# Patient Record
Sex: FEMALE | Race: WHITE | NOT HISPANIC OR LATINO | Employment: PART TIME | URBAN - METROPOLITAN AREA
[De-identification: names, ages, dates, MRNs, and addresses within clinical notes are randomized per-mention and may not be internally consistent; named-entity substitution may affect disease eponyms.]

---

## 2018-12-13 ENCOUNTER — APPOINTMENT (EMERGENCY)
Dept: RADIOLOGY | Facility: HOSPITAL | Age: 28
End: 2018-12-13
Payer: MEDICARE

## 2018-12-13 ENCOUNTER — HOSPITAL ENCOUNTER (EMERGENCY)
Facility: HOSPITAL | Age: 28
Discharge: HOME | End: 2018-12-13
Attending: EMERGENCY MEDICINE | Admitting: EMERGENCY MEDICINE
Payer: MEDICARE

## 2018-12-13 VITALS
OXYGEN SATURATION: 98 % | TEMPERATURE: 98.5 F | RESPIRATION RATE: 16 BRPM | DIASTOLIC BLOOD PRESSURE: 64 MMHG | SYSTOLIC BLOOD PRESSURE: 125 MMHG | WEIGHT: 122 LBS | HEART RATE: 79 BPM

## 2018-12-13 DIAGNOSIS — J45.21 MILD INTERMITTENT ASTHMA WITH EXACERBATION: Primary | ICD-10-CM

## 2018-12-13 PROCEDURE — 71046 X-RAY EXAM CHEST 2 VIEWS: CPT

## 2018-12-13 PROCEDURE — 99283 EMERGENCY DEPT VISIT LOW MDM: CPT

## 2018-12-13 RX ORDER — PREDNISONE 10 MG/1
40 TABLET ORAL DAILY
Qty: 20 TABLET | Refills: 0 | Status: SHIPPED | OUTPATIENT
Start: 2018-12-13 | End: 2018-12-18

## 2018-12-13 RX ORDER — FLUTICASONE PROPIONATE 50 MCG
1 SPRAY, SUSPENSION (ML) NASAL DAILY
Qty: 16 G | Refills: 0 | Status: SHIPPED | OUTPATIENT
Start: 2018-12-13 | End: 2019-12-13

## 2018-12-13 RX ORDER — ALBUTEROL SULFATE 90 UG/1
2 AEROSOL, METERED RESPIRATORY (INHALATION) ONCE
Status: COMPLETED | OUTPATIENT
Start: 2018-12-13 | End: 2018-12-13

## 2018-12-13 RX ADMIN — ALBUTEROL SULFATE 2 PUFF: 90 AEROSOL, METERED RESPIRATORY (INHALATION) at 14:05

## 2018-12-13 NOTE — ED PROVIDER NOTES
History  Chief Complaint   Patient presents with    Ear Problem     Pt states " I hear my heart beat in my left ear and ringing in my right " Notes had a concussion one year ago  Also notes weight loss over the last month  Reports cough and wheezing  Has hx of asthma   Cough       History provided by:  Patient  Cough   Cough characteristics:  Dry  Sputum characteristics:  Nondescript  Severity:  Moderate  Onset quality:  Gradual  Duration:  2 weeks  Timing:  Intermittent  Progression:  Waxing and waning  Chronicity:  New  Smoker: yes    Context: upper respiratory infection    Context comment:  History of asthma and smokes  Relieved by:  None tried  Worsened by:  Nothing  Ineffective treatments:  None tried  Associated symptoms: ear fullness    Associated symptoms: no chest pain, no chills, no diaphoresis, no ear pain, no eye discharge, no fever, no headaches, no rash, no rhinorrhea, no shortness of breath, no sore throat and no wheezing        None       Past Medical History:   Diagnosis Date    Asthma        History reviewed  No pertinent surgical history  History reviewed  No pertinent family history  I have reviewed and agree with the history as documented  Social History   Substance Use Topics    Smoking status: Current Every Day Smoker    Smokeless tobacco: Never Used    Alcohol use Yes        Review of Systems   Constitutional: Negative for activity change, chills, diaphoresis and fever  HENT: Negative for congestion, ear pain, rhinorrhea, sinus pressure and sore throat  Eyes: Negative for pain, discharge and visual disturbance  Respiratory: Positive for cough  Negative for chest tightness, shortness of breath, wheezing and stridor  Cardiovascular: Negative for chest pain and palpitations  Gastrointestinal: Negative for abdominal distention, abdominal pain, constipation, diarrhea, nausea and vomiting  Genitourinary: Negative for dysuria and frequency     Musculoskeletal: Negative for neck pain and neck stiffness  Skin: Negative for rash  Neurological: Negative for dizziness, speech difficulty, light-headedness, numbness and headaches  Physical Exam  Physical Exam   Constitutional: She is oriented to person, place, and time  She appears well-developed  No distress  HENT:   Head: Normocephalic and atraumatic  Eyes: Pupils are equal, round, and reactive to light  Neck: Normal range of motion  Neck supple  No tracheal deviation present  Cardiovascular: Normal rate, regular rhythm, normal heart sounds and intact distal pulses  No murmur heard  Pulmonary/Chest: Effort normal  No stridor  No respiratory distress  She has wheezes  Abdominal: Soft  She exhibits no distension  There is no tenderness  There is no rebound and no guarding  Musculoskeletal: Normal range of motion  Neurological: She is alert and oriented to person, place, and time  Skin: Skin is warm and dry  She is not diaphoretic  No erythema  No pallor  Psychiatric: She has a normal mood and affect  Vitals reviewed  Vital Signs  ED Triage Vitals [12/13/18 1337]   Temperature Pulse Respirations Blood Pressure SpO2   98 5 °F (36 9 °C) 79 16 125/64 98 %      Temp Source Heart Rate Source Patient Position - Orthostatic VS BP Location FiO2 (%)   Oral -- -- -- --      Pain Score       7           Vitals:    12/13/18 1337   BP: 125/64   Pulse: 79       Visual Acuity      ED Medications  Medications   albuterol (PROVENTIL HFA,VENTOLIN HFA) inhaler 2 puff (2 puffs Inhalation Given 12/13/18 1405)       Diagnostic Studies  Results Reviewed     None                 XR chest 2 views   ED Interpretation by Gildardo Johnson DO (12/13 1405)   No acute pathology      Final Result by Yusuf Doss MD (12/13 9148)      No acute cardiopulmonary disease              Workstation performed: [id number]                    Procedures  Procedures       Phone Contacts  ED Phone Contact    ED Course MDM  Number of Diagnoses or Management Options  Mild intermittent asthma with exacerbation: new and requires workup  Diagnosis management comments: 57-year-old female history of asthma, on off URI symptoms over the past couple of weeks, chest x-ray without any evidence of pneumonia  Will place on a steroid pulse, given a albuterol inhaler here , will discharge       Amount and/or Complexity of Data Reviewed  Tests in the radiology section of CPT®: ordered and reviewed  Review and summarize past medical records: yes  Independent visualization of images, tracings, or specimens: yes      CritCare Time    Disposition  Final diagnoses:   Mild intermittent asthma with exacerbation     Time reflects when diagnosis was documented in both MDM as applicable and the Disposition within this note     Time User Action Codes Description Comment    12/13/2018  2:06 PM Denise Media Add [J45 21] Mild intermittent asthma with exacerbation       ED Disposition     ED Disposition Condition Comment    Discharge  [address] discharge to home/self care  Condition at discharge: Good        Follow-up Information    None         Discharge Medication List as of 12/13/2018  2:06 PM      START taking these medications    Details   fluticasone (FLONASE) 50 mcg/act nasal spray 1 spray into each nostril daily, Starting Thu 12/13/2018, Until Fri 12/13/2019, Print      predniSONE 10 mg tablet Take 4 tablets (40 mg total) by mouth daily for 5 days, Starting Thu 12/13/2018, Until Tue 12/18/2018, Print           No discharge procedures on file      ED Provider  Electronically Signed by           Bebo Mccann DO  12/13/18 8373

## 2018-12-13 NOTE — DISCHARGE INSTRUCTIONS
Asthma, Ambulatory Care   GENERAL INFORMATION:   Asthma  is a lung disease that makes breathing difficult  Chronic inflammation and reactions to triggers narrow the airways in your lungs  Asthma can become life-threatening if it is not managed  Common symptoms include the following:   · Coughing     · Wheezing     · Shortness of breath     · Chest tightness  Seek immediate care for the following symptoms:   · Severe shortness of breath    · Blue or gray lips or nails    · Skin around your neck and ribs pulls in with each breath    · Shortness of breath, even after you take your short-term medicine as directed     · Peak flow numbers in the red zone of your asthma action plan  Treatment for asthma  will depend on how severe it is  Medicine may decrease inflammation, open airways, and make it easier to breathe  Medicines may be inhaled, taken as a pill, or injected  Short-term medicines relieve your symptoms quickly  Long-term medicines are used to prevent future attacks  You may also need medicine to help control your allergies  Manage and prevent future asthma attacks:   · Follow your asthma action pan  This is a written plan that you and your healthcare provider create  It explains which medicine you need and when to change doses if necessary  It also explains how you can monitor symptoms and use a peak flow meter  The meter measures how well your lungs are working  · Manage other health conditions , such as allergies, acid reflux, and sleep apnea  · Identify and avoid triggers  These may include pets, dust mites, mold, and cockroaches  · Do not smoke and avoid others who smoke  If you smoke, it is never too late to quit  Ask your healthcare provider if you need help quitting  · Ask about a flu vaccine  The flu can make your asthma worse  You may need a yearly flu shot  Follow up with your healthcare provider as directed:   You will need to return to make sure your medicine is working and your symptoms are controlled  You may be referred to an asthma or allergy specialist  Fredany Zazueta may be asked to keep a record of your peak flow values and bring it with you to your appointments  Write down your questions so you remember to ask them during your visits  CARE AGREEMENT:   You have the right to help plan your care  Learn about your health condition and how it may be treated  Discuss treatment options with your caregivers to decide what care you want to receive  You always have the right to refuse treatment  The above information is an  only  It is not intended as medical advice for individual conditions or treatments  Talk to your doctor, nurse or pharmacist before following any medical regimen to see if it is safe and effective for you  © 2014 [address] is for End User's use only and may not be sold, redistributed or otherwise used for commercial purposes  All illustrations and images included in CareNotes® are the copyrighted property of A D A M [employer] Inc  or Akshat Patel

## 2019-07-15 ENCOUNTER — HOSPITAL ENCOUNTER (EMERGENCY)
Facility: HOSPITAL | Age: 29
Discharge: HOME | End: 2019-07-15
Attending: EMERGENCY MEDICINE | Admitting: EMERGENCY MEDICINE
Payer: MEDICARE

## 2019-07-15 VITALS
WEIGHT: 115 LBS | TEMPERATURE: 98.6 F | HEART RATE: 71 BPM | RESPIRATION RATE: 18 BRPM | HEIGHT: 63 IN | DIASTOLIC BLOOD PRESSURE: 52 MMHG | BODY MASS INDEX: 20.38 KG/M2 | OXYGEN SATURATION: 100 % | SYSTOLIC BLOOD PRESSURE: 108 MMHG

## 2019-07-15 DIAGNOSIS — M54.2 NECK PAIN: Primary | ICD-10-CM

## 2019-07-15 PROCEDURE — 99284 EMERGENCY DEPT VISIT MOD MDM: CPT | Performed by: EMERGENCY MEDICINE

## 2019-07-15 PROCEDURE — 99283 EMERGENCY DEPT VISIT LOW MDM: CPT

## 2019-07-15 PROCEDURE — 96372 THER/PROPH/DIAG INJ SC/IM: CPT

## 2019-07-15 RX ORDER — KETOROLAC TROMETHAMINE 30 MG/ML
15 INJECTION, SOLUTION INTRAMUSCULAR; INTRAVENOUS ONCE
Status: COMPLETED | OUTPATIENT
Start: 2019-07-15 | End: 2019-07-15

## 2019-07-15 RX ORDER — LIDOCAINE 50 MG/G
1 PATCH TOPICAL ONCE
Status: DISCONTINUED | OUTPATIENT
Start: 2019-07-15 | End: 2019-07-15 | Stop reason: HOSPADM

## 2019-07-15 RX ORDER — ALBUTEROL SULFATE 2.5 MG/3ML
2.5 SOLUTION RESPIRATORY (INHALATION) EVERY 6 HOURS PRN
COMMUNITY
Start: 2017-10-31

## 2019-07-15 RX ORDER — MELOXICAM 7.5 MG/1
7.5 TABLET ORAL DAILY
COMMUNITY
Start: 2019-06-26 | End: 2019-07-26

## 2019-07-15 RX ORDER — METHOCARBAMOL 750 MG/1
750 TABLET, FILM COATED ORAL 4 TIMES DAILY PRN
COMMUNITY
Start: 2019-06-26

## 2019-07-15 RX ADMIN — KETOROLAC TROMETHAMINE 15 MG: 30 INJECTION, SOLUTION INTRAMUSCULAR at 15:22

## 2019-07-15 RX ADMIN — LIDOCAINE 1 PATCH: 50 PATCH CUTANEOUS at 15:22

## 2019-07-15 NOTE — ED PROVIDER NOTES
History  Chief Complaint   Patient presents with    Neck Pain     Pt reports feeling a "snap from her head to her neck after picking up a bag" Pt reports she was seen at [facility] for this, diagnoses with a sprain and sent home  Pt reports the pain is worse now      30yo presenting with neck pain which started 2 weeks ago  She states she was lifting a heavy bag and felt and heard a snap sound  Describes the pain as a constant 9/10 pain which starts at the base of the left side of her head and radiates to her shoulder  Patient states she was seen at 2020  for the pain a week ago and has follow-up appointments scheduled with her PCP and orthopedics soon, but the pain is constant and has not improved  Also complains of some tingling in both hands bilaterally  She has tried meloxicam which does not help her pain and heat which slightly reduces her pain  Denies any fever, chills, SOB, swelling in extremities  Prior to Admission Medications   Prescriptions Last Dose Informant Patient Reported? Taking? albuterol (2 5 mg/3 mL) 0 083 % nebulizer solution Past Week at Unknown time  Yes Yes   Sig: Inhale 2 5 mg every 6 (six) hours as needed   fluticasone (FLONASE) 50 mcg/act nasal spray Past Week at Unknown time  No Yes   Si spray into each nostril daily   meloxicam (MOBIC) 7 5 mg tablet Past Week at Unknown time  Yes Yes   Sig: Take 7 5 mg by mouth daily   methocarbamol (ROBAXIN-750) 750 mg tablet Past Week at Unknown time  Yes Yes   Sig: Take 750 mg by mouth 4 (four) times a day as needed      Facility-Administered Medications: None       Past Medical History:   Diagnosis Date    Asthma     Chronic pain     Lordosis     Tumor     "T1 Tumor"       History reviewed  No pertinent surgical history  History reviewed  No pertinent family history  I have reviewed and agree with the history as documented      Social History     Tobacco Use    Smoking status: Current Every Day Smoker     Packs/day: 0 25    Smokeless tobacco: Never Used   Substance Use Topics    Alcohol use: Yes     Comment: Occ    Drug use: Yes     Types: Marijuana     Comment: Medical         Review of Systems   Constitutional: Negative for fever  HENT: Negative for ear pain and tinnitus  Respiratory: Negative for chest tightness, shortness of breath, wheezing and stridor  Cardiovascular: Negative for chest pain, palpitations and leg swelling  Gastrointestinal: Negative for nausea and vomiting  Musculoskeletal: Positive for neck pain and neck stiffness  Negative for back pain  Skin: Negative for color change, rash and wound  Neurological: Negative for syncope  All other systems reviewed and are negative  Physical Exam  ED Triage Vitals [07/15/19 1410]   Temperature Pulse Respirations Blood Pressure SpO2   98 6 °F (37 °C) 71 18 108/52 100 %      Temp Source Heart Rate Source Patient Position - Orthostatic VS BP Location FiO2 (%)   Oral Monitor Sitting Left arm --      Pain Score       9             Orthostatic Vital Signs  Vitals:    07/15/19 1410   BP: 108/52   Pulse: 71   Patient Position - Orthostatic VS: Sitting       Physical Exam   Constitutional: She is oriented to person, place, and time  She appears well-developed and well-nourished  No distress  HENT:   Head: Normocephalic and atraumatic  Eyes: Conjunctivae and EOM are normal  Right eye exhibits no discharge  Left eye exhibits no discharge  No scleral icterus  Neck: No JVD present  No tracheal deviation present  Left sided neck and trapezius tenderness   Cardiovascular: Normal rate, regular rhythm, normal heart sounds and intact distal pulses  Exam reveals no gallop and no friction rub  No murmur heard  Pulmonary/Chest: Effort normal and breath sounds normal  No stridor  No respiratory distress  She has no wheezes  She has no rales  She exhibits no tenderness  Musculoskeletal: She exhibits tenderness  She exhibits no edema or deformity     Pain with all planes of movement of neck   Neurological: She is alert and oriented to person, place, and time  No cranial nerve deficit or sensory deficit  She exhibits normal muscle tone  Skin: Skin is warm  Capillary refill takes less than 2 seconds  No rash noted  She is not diaphoretic  No erythema  Psychiatric: She has a normal mood and affect  Her behavior is normal  Judgment and thought content normal        ED Medications  Medications   lidocaine (LIDODERM) 5 % patch 1 patch (1 patch Topical Medication Applied 7/15/19 1522)   ketorolac (TORADOL) injection 15 mg (15 mg Intramuscular Given 7/15/19 1522)       Diagnostic Studies  Results Reviewed     None                 No orders to display         Procedures  Procedures        ED Course                               MDM    Disposition  Final diagnoses:   Neck pain     Time reflects when diagnosis was documented in both MDM as applicable and the Disposition within this note     Time User Action Codes Description Comment    7/15/2019  3:23 PM Daniele Pollack [M54 2] Neck pain       ED Disposition     ED Disposition Condition Date/Time Comment    Discharge Stable Mon Jul 15, 2019  3:36 PM Lory Frederick discharge to home/self care  Follow-up Information     Follow up With Specialties Details Why Contact Info Additional [address] Emergency Department Emergency Medicine  If symptoms worsen [address]  [phone]  ED, [address] [place] [address], [facility]          Patient's Medications   Discharge Prescriptions    No medications on file     No discharge procedures on file  ED Provider  Attending physically available and evaluated Lory Frederick I managed the patient along with the ED Attending      Electronically Signed by         Jacques Leone DO  07/15/19 8090

## 2019-07-15 NOTE — ED ATTENDING ATTESTATION
Felizardo Barthel, saw and evaluated the patient  I have discussed the patient with the resident/non-physician practitioner and agree with the resident's/non-physician practitioner's findings, Plan of Care, and MDM as documented in the resident's/non-physician practitioner's note, except where noted  All available labs and Radiology studies were reviewed  I was present for key portions of any procedure(s) performed by the resident/non-physician practitioner and I was immediately available to provide assistance  At this point I agree with the current assessment done in the Emergency Department  I have conducted an independent evaluation of this patient a history and physical is as follows:      Patient is a 82-year-old female tells me 2 weeks ago she picked up something and felt a snap in the left side of her neck base  Since then she has been having some mild pain in that area, no numbness, no tingling, no weakness  No direct trauma, no fevers or chills  She does say she has 11 years of chronic diffuse body pain everywhere  She says she was seen at [facility] emergency department about 2 weeks ago after the incident, diagnosed with a muscular strain and referred to follow up with her PCP and Orthopedics  She has not been able to do so yet, she says because of family issues however she does have an appointment with each, believes there sometime this week  She said she has not been taking any Tylenol, ibuprofen or other medications for the pain because they reportedly do not do anything for her  She does say she was prescribed a lidocaine patch from [facility] Emergency Department  She has been prescribed meloxicam and a Robaxin by other providers for her chronic discomfort, she is occasionally using them but not consistently  She stopped using the lidocaine patch because she said it was not very helpful      General:  Patient is well-appearing  Head:  Atraumatic  Eyes:  Conjunctiva pink, Extraocular muscle intact  ENT:  Mucous members are moist  Neck:  Supple, there is no warmth, no redness  She has no cervical, thoracic or lumbar spinal tenderness or step-offs or deformities  Cardiac:  S1-S2, without murmurs  Lungs:  Clear to auscultation bilaterally  Abdomen:  Soft, nontender, normal bowel sounds, no CVA tenderness, no tympany, no rigidity, no guarding  Extremities:  Normal range of motion  Neurologic:  Awake, fluent speech, normal comprehension  Her sensation is equal and symmetric and intact throughout the neck, trunk and arms and completely symmetric  Strength is 5/5 of the bilateral shoulders, elbows, wrists,  strengths are equal and symmetric bilaterally  There is no deficit in motor or sensory function in the median, radial, ulnar nerve distribution in either arm  Radial pulses are equal and symmetric  Negative Spurling, negative elevated arm stress test  Skin:  Pink warm and dry  Psychiatric:  Alert, pleasant, cooperative    The patient has no signs of neurologic compromise, no weakness, no sensory deficits  Do not believe she requires any neuroimaging at this point, is stable to follow up with PCP and Orthopedics for further outpatient care      Critical Care Time  Procedures

## 2020-03-30 ENCOUNTER — NURSE TRIAGE (OUTPATIENT)
Dept: OTHER | Facility: OTHER | Age: 30
End: 2020-03-30

## 2020-03-30 NOTE — TELEPHONE ENCOUNTER
Regarding: Coronavirus  ----- Message from Dilcia Nazario sent at 3/30/2020  4:58 AM EDT -----  "I found out a fellow employee was tested positive for Coronavirus   The only symptom I currently having is diarrhea "

## 2020-03-30 NOTE — TELEPHONE ENCOUNTER
Called and left message for patient to call her PCP in regards to what she should do  Also advised that if she needs testing to go back to work,  and follow their protocol  Can call back to hotline for further questions

## 2022-04-13 ENCOUNTER — TELEPHONE (OUTPATIENT)
Dept: PSYCHIATRY | Facility: CLINIC | Age: 32
End: 2022-04-13

## 2022-04-20 ENCOUNTER — TELEPHONE (OUTPATIENT)
Dept: PSYCHIATRY | Facility: CLINIC | Age: 32
End: 2022-04-20

## 2022-04-20 NOTE — TELEPHONE ENCOUNTER
Called pt to let pt know that the referral came in  And placed pt on the referral wait list for both talk and med mgmt   Call couldn't be completed at the time I called

## 2022-05-11 ENCOUNTER — HOSPITAL ENCOUNTER (EMERGENCY)
Facility: HOSPITAL | Age: 32
Discharge: HOME | End: 2022-05-11
Attending: EMERGENCY MEDICINE | Admitting: EMERGENCY MEDICINE
Payer: MEDICARE

## 2022-05-11 ENCOUNTER — APPOINTMENT (EMERGENCY)
Dept: RADIOLOGY | Facility: HOSPITAL | Age: 32
End: 2022-05-11
Payer: MEDICARE

## 2022-05-11 VITALS
SYSTOLIC BLOOD PRESSURE: 118 MMHG | DIASTOLIC BLOOD PRESSURE: 60 MMHG | BODY MASS INDEX: 20.73 KG/M2 | WEIGHT: 117 LBS | HEART RATE: 73 BPM | RESPIRATION RATE: 20 BRPM | TEMPERATURE: 97.9 F | OXYGEN SATURATION: 93 %

## 2022-05-11 DIAGNOSIS — J45.901 ASTHMA EXACERBATION: ICD-10-CM

## 2022-05-11 DIAGNOSIS — R06.00 DYSPNEA: Primary | ICD-10-CM

## 2022-05-11 DIAGNOSIS — J06.9 URI (UPPER RESPIRATORY INFECTION): ICD-10-CM

## 2022-05-11 LAB
ATRIAL RATE: 72 BPM
FLUAV RNA RESP QL NAA+PROBE: NEGATIVE
FLUBV RNA RESP QL NAA+PROBE: NEGATIVE
QRS AXIS: 83 DEGREES
QRSD INTERVAL: 94 MS
QT INTERVAL: 406 MS
QTC INTERVAL: 412 MS
RSV RNA RESP QL NAA+PROBE: NEGATIVE
SARS-COV-2 RNA RESP QL NAA+PROBE: NEGATIVE
T WAVE AXIS: 72 DEGREES
VENTRICULAR RATE: 62 BPM

## 2022-05-11 PROCEDURE — 99284 EMERGENCY DEPT VISIT MOD MDM: CPT | Performed by: EMERGENCY MEDICINE

## 2022-05-11 PROCEDURE — 71046 X-RAY EXAM CHEST 2 VIEWS: CPT

## 2022-05-11 PROCEDURE — 93005 ELECTROCARDIOGRAM TRACING: CPT

## 2022-05-11 PROCEDURE — 93010 ELECTROCARDIOGRAM REPORT: CPT | Performed by: INTERNAL MEDICINE

## 2022-05-11 PROCEDURE — 0241U HB NFCT DS VIR RESP RNA 4 TRGT: CPT | Performed by: EMERGENCY MEDICINE

## 2022-05-11 PROCEDURE — 94640 AIRWAY INHALATION TREATMENT: CPT

## 2022-05-11 PROCEDURE — 99285 EMERGENCY DEPT VISIT HI MDM: CPT

## 2022-05-11 RX ORDER — PREDNISONE 20 MG/1
60 TABLET ORAL ONCE
Status: COMPLETED | OUTPATIENT
Start: 2022-05-11 | End: 2022-05-11

## 2022-05-11 RX ORDER — PREDNISONE 20 MG/1
40 TABLET ORAL DAILY
Qty: 8 TABLET | Refills: 0 | Status: SHIPPED | OUTPATIENT
Start: 2022-05-11 | End: 2022-05-15

## 2022-05-11 RX ORDER — ALBUTEROL SULFATE 90 UG/1
2 AEROSOL, METERED RESPIRATORY (INHALATION) EVERY 6 HOURS PRN
Qty: 20.1 G | Refills: 0 | Status: SHIPPED | OUTPATIENT
Start: 2022-05-11

## 2022-05-11 RX ORDER — IPRATROPIUM BROMIDE AND ALBUTEROL SULFATE 2.5; .5 MG/3ML; MG/3ML
3 SOLUTION RESPIRATORY (INHALATION)
Status: DISCONTINUED | OUTPATIENT
Start: 2022-05-11 | End: 2022-05-11 | Stop reason: HOSPADM

## 2022-05-11 RX ADMIN — PREDNISONE 60 MG: 20 TABLET ORAL at 13:35

## 2022-05-11 RX ADMIN — IPRATROPIUM BROMIDE AND ALBUTEROL SULFATE 3 ML: 2.5; .5 SOLUTION RESPIRATORY (INHALATION) at 13:36

## 2022-05-11 NOTE — ED ATTENDING ATTESTATION
Manjula Cheng MD, saw and evaluated the patient  I have discussed the patient with the resident and agree with the resident's findings, Plan of Care, and MDM as documented in the resident's note, except where noted  All available labs and Radiology studies were reviewed  I was present for key portions of any procedure(s) performed by the resident and I was immediately available to provide assistance  At this point I agree with the current assessment done in the Emergency Department  I have conducted an independent evaluation of this patient a history and physical is as follows:    37 yo female with a history of asthma and generalized anxiety disorder presents to the ED complaining of URI symptoms x 3 days  The patient reports a cough productive of "clear" sputum, rhinorrhea, nasal congestion, a mild sore throat, and generalized fatigue  (+) Mild shortness of breath with exertion  (+) Tobacco user  (+) Sick contact --> son with similar symptoms at home  The patient did not receive the COVID or influenza vaccines  No fevers/chills  No chest pain or tightness  She denies LE swelling/pain  No hemoptysis  No other specific complaints  Gen: NAD, AA&Ox3  HEENT: PERRL, EOMI, TMs clear B/L  Throat: (+) mild erythema, no tonsillar swelling, no exudate, uvula midline, no PTA  Neck: supple  CV: RRR  Lungs: (+) scattered bilateral wheezes, (+) good air movement  Abdomen: soft, NT/ND  Ext: no swelling or deformity  Neuro: 5/5 strength all extremities, sensation grossly intact    ED Course  The patient is comfortable appearing with stable vital signs  (+) Scattered bilateral wheezes on exam  CXR unremarkable  COVID/flu swab sent to the lab  Plan for a course of oral steroids and albuterol MDI prn  The patient was instructed to follow up with her PCP later this week for reassessment  She is agreeable to this plan  Strict return precautions provided        Critical Care Time  Procedures

## 2022-05-11 NOTE — Clinical Note
Sea Dylan was seen and treated in our emergency department on 5/11/2022  Diagnosis:     Eddie    She may return on this date: 05/13/2022    May return to work when fever free for 24 hours without the use of Tylenol or ibuprofen  If Eddie's COVID test is positive, she should stay home from work until 5/15  If you have any questions or concerns, please don't hesitate to call        Sushma Callejas MD    ______________________________           _______________          _______________  Hospital Representative                              Date                                Time

## 2022-10-03 ENCOUNTER — TELEPHONE (OUTPATIENT)
Dept: PSYCHIATRY | Facility: CLINIC | Age: 32
End: 2022-10-03

## 2022-10-03 NOTE — TELEPHONE ENCOUNTER
contacted patient off med mgmt waitlist to verify needs of services in attempts to update waitlist  spoke w  patient whom stated due to them sleeping they did not want to answer question but was still interested will contact at earliest convience

## 2023-01-18 ENCOUNTER — TELEPHONE (OUTPATIENT)
Dept: PSYCHIATRY | Facility: CLINIC | Age: 33
End: 2023-01-18